# Patient Record
Sex: MALE | Race: WHITE | NOT HISPANIC OR LATINO | Employment: FULL TIME | URBAN - METROPOLITAN AREA
[De-identification: names, ages, dates, MRNs, and addresses within clinical notes are randomized per-mention and may not be internally consistent; named-entity substitution may affect disease eponyms.]

---

## 2022-01-27 ENCOUNTER — HOSPITAL ENCOUNTER (EMERGENCY)
Facility: CLINIC | Age: 32
Discharge: HOME | End: 2022-01-27
Attending: INTERNAL MEDICINE | Admitting: INTERNAL MEDICINE
Payer: COMMERCIAL

## 2022-01-27 ENCOUNTER — APPOINTMENT (OUTPATIENT)
Dept: GENERAL RADIOLOGY | Facility: CLINIC | Age: 32
End: 2022-01-27
Attending: INTERNAL MEDICINE
Payer: COMMERCIAL

## 2022-01-27 VITALS
OXYGEN SATURATION: 99 % | BODY MASS INDEX: 27.12 KG/M2 | HEART RATE: 70 BPM | TEMPERATURE: 98.6 F | SYSTOLIC BLOOD PRESSURE: 130 MMHG | RESPIRATION RATE: 16 BRPM | WEIGHT: 200 LBS | DIASTOLIC BLOOD PRESSURE: 94 MMHG

## 2022-01-27 DIAGNOSIS — S42.025A CLOSED NONDISPLACED FRACTURE OF SHAFT OF LEFT CLAVICLE, INITIAL ENCOUNTER: ICD-10-CM

## 2022-01-27 PROCEDURE — 73030 X-RAY EXAM OF SHOULDER: CPT | Mod: LT

## 2022-01-27 PROCEDURE — 73000 X-RAY EXAM OF COLLAR BONE: CPT | Mod: LT

## 2022-01-27 PROCEDURE — 99284 EMERGENCY DEPT VISIT MOD MDM: CPT | Mod: 25

## 2022-01-27 PROCEDURE — 99283 EMERGENCY DEPT VISIT LOW MDM: CPT | Mod: 25 | Performed by: INTERNAL MEDICINE

## 2022-01-27 PROCEDURE — 23500 CLTX CLAVICULAR FX W/O MNPJ: CPT | Mod: LT

## 2022-01-27 PROCEDURE — 23500 CLTX CLAVICULAR FX W/O MNPJ: CPT | Mod: 54 | Performed by: INTERNAL MEDICINE

## 2022-01-27 ASSESSMENT — ENCOUNTER SYMPTOMS
CONFUSION: 0
HEADACHES: 0
DIFFICULTY URINATING: 0
EYE REDNESS: 0
NECK STIFFNESS: 0
ARTHRALGIAS: 0
SHORTNESS OF BREATH: 0
FEVER: 0
COLOR CHANGE: 0
ABDOMINAL PAIN: 0
JOINT SWELLING: 1
NECK PAIN: 0

## 2022-01-28 ENCOUNTER — TELEPHONE (OUTPATIENT)
Dept: ORTHOPEDICS | Facility: CLINIC | Age: 32
End: 2022-01-28
Payer: COMMERCIAL

## 2022-01-28 NOTE — DISCHARGE INSTRUCTIONS
Please make an appointment to follow up with Orthopedics Clinic (phone: [phone]) or Sports Medicine (phone: [phone]) in 5-10 days even if entirely better.

## 2022-01-28 NOTE — ED PROVIDER NOTES
[facility] [place] ([facility])       1/27/22  History     Chief Complaint   Patient presents with     Shoulder Pain     playing hockey and collided with a goal post, felt collar bone pop twice, constant pain that worsens with movement     The history is provided by the patient and medical records.     Willem Coates is a 31 year old male who presents to the Emergency Department for evaluation of shoulder pain. Patient states he ran into a post while playing hockey tonight. Patient reports left anterior shoulder and clavicle pain, with some redness superior to the left clavicle. Patient states the pain is most intense with flexion of the left arm in front of the body. He denies arm pain or neck pain.     Past Medical History  History reviewed. No pertinent past medical history.  History reviewed. No pertinent surgical history.  ADDERALL 20 MG PO TABS  fexofenadine (ALLEGRA) 180 MG tablet  FLONASE 50 MCG/ACT NA SUSP      Allergies   Allergen Reactions     Dust Mites      Grass      Pollen Extract      Trees      Family History  History reviewed. No pertinent family history.  Social History   Social History     Tobacco Use     Smoking status: Current Every Day Smoker     Packs/day: 1.00     Smokeless tobacco: Never Used   Substance Use Topics     Alcohol use: Yes     Alcohol/week: 0.0 standard drinks     Drug use: No      Past medical history, past surgical history, medications, allergies, family history, and social history were reviewed with the patient. No additional pertinent items.     I have reviewed the Medications, Allergies, Past Medical and Surgical History, and Social History in the Epic system.    Review of Systems   Constitutional: Negative for fever.   HENT: Negative for congestion.    Eyes: Negative for redness.   Respiratory: Negative for shortness of breath.    Cardiovascular: Negative for chest pain.   Gastrointestinal: Negative for abdominal pain.   Genitourinary: Negative for  difficulty urinating.   Musculoskeletal: Positive for joint swelling. Negative for arthralgias, neck pain and neck stiffness.        Positive for left shoulder and clavicle pain   Skin: Negative for color change.   Neurological: Negative for headaches.   Psychiatric/Behavioral: Negative for confusion.     A complete review of systems was performed with pertinent positives and negatives noted in the HPI, and all other systems negative.    Physical Exam   BP: 134/79  Pulse: 70  Temp: 98.6  F (37  C)  Resp: 16  Weight: 90.7 kg (200 lb)  SpO2: 99 %      Physical Exam  Constitutional:       General: He is not in acute distress.     Appearance: He is not diaphoretic.   HENT:      Head: Atraumatic.   Eyes:      General: No scleral icterus.     Pupils: Pupils are equal, round, and reactive to light.   Cardiovascular:      Rate and Rhythm: Regular rhythm.      Heart sounds: Normal heart sounds.   Pulmonary:      Effort: No respiratory distress.      Breath sounds: Normal breath sounds.   Chest:      Chest wall: No tenderness.   Abdominal:      General: Bowel sounds are normal.      Palpations: Abdomen is soft.      Tenderness: There is no abdominal tenderness.   Musculoskeletal:      Right shoulder: Normal.      Left shoulder: Swelling, tenderness, bony tenderness and crepitus present. No deformity, effusion or laceration. Decreased range of motion. Normal strength. Normal pulse.        Arms:       Cervical back: Neck supple. No tenderness.      Thoracic back: No tenderness.      Lumbar back: No tenderness.   Skin:     General: Skin is warm.      Findings: No rash.   Neurological:      Mental Status: He is oriented to person, place, and time.         ED Course   At 9:46 PM the patient was seen and examined by Jolene Arias MD in Room ED03.        Procedures           Results for orders placed or performed during the hospital encounter of 01/27/22 (from the past 24 hour(s))   XR Shoulder Left G/E 3 Views    Narrative     EXAM: XR CLAVICLE LT 2 VIEWS, XR SHOULDER LEFT G/E 3 VIEWS  LOCATION: [facility]  DATE/TIME: 1/27/2022 10:31 PM    INDICATION: trauma pain  COMPARISON: None.      Impression    IMPRESSION: Undisplaced fracture of the shaft of the left clavicle at the junction of the proximal and middle thirds. The left glenohumeral joint is normal. No dislocation.    Clavicle XR, left    Narrative    EXAM: XR CLAVICLE LT 2 VIEWS, XR SHOULDER LEFT G/E 3 VIEWS  LOCATION: [facility]  DATE/TIME: 1/27/2022 10:31 PM    INDICATION: trauma pain  COMPARISON: None.      Impression    IMPRESSION: Undisplaced fracture of the shaft of the left clavicle at the junction of the proximal and middle thirds. The left glenohumeral joint is normal. No dislocation.      Medications - No data to display          Assessments & Plan (with Medical Decision Making)    Left clavicular closed nondisplaced fx as above after fall at the hockey, shoulder immobilizer in place, may use tylenol and or ibuprofen(pt had opioid use disorder in the past), follow up with Ortho or Sports Med in one week.         I have reviewed the nursing notes.    I have reviewed the findings, diagnosis, plan and need for follow up with the patient.    New Prescriptions    No medications on file       Final diagnoses:   Closed nondisplaced fracture of shaft of left clavicle, initial encounter       Delmi OCAMPO am serving as a trained medical scribe to document services personally performed by Jolene Arias MD, based on the provider's statements to me.      Jolene OCAMPO MD, was physically present and have reviewed and verified the accuracy of this note documented by Delmi Taylor.     Jolene Arias MD  1/27/2022   [facility] EMERGENCY DEPARTMENT     Jolene Arias MD  01/27/22 [id number]

## 2022-01-30 NOTE — TELEPHONE ENCOUNTER
DIAGNOSIS: closed nondisplaced fracture of shaft of left clavicle    APPOINTMENT DATE: 2.3.22   NOTES STATUS DETAILS   OFFICE NOTE from referring provider Internal 1.27.22 [facility] ED   OFFICE NOTE from other specialist N/A    DISCHARGE SUMMARY from hospital N/A    DISCHARGE REPORT from the ER Internal 1.27.22 [facility] ED   OPERATIVE REPORT N/A    EMG report N/A    MEDICATION LIST Internal    MRI N/A    DEXA (osteoporosis/bone health) N/A    CT SCAN N/A    XRAYS (IMAGES & REPORTS) Internal 1.27.22 L clavicle  1.27.22 L shoulder

## 2022-02-03 ENCOUNTER — PRE VISIT (OUTPATIENT)
Dept: ORTHOPEDICS | Facility: CLINIC | Age: 32
End: 2022-02-03

## 2022-02-03 ENCOUNTER — OFFICE VISIT (OUTPATIENT)
Dept: ORTHOPEDICS | Facility: CLINIC | Age: 32
End: 2022-02-03
Payer: COMMERCIAL

## 2022-02-03 VITALS — BODY MASS INDEX: 27.09 KG/M2 | HEIGHT: 72 IN | WEIGHT: 200 LBS

## 2022-02-03 DIAGNOSIS — S42.025A CLOSED NONDISPLACED FRACTURE OF SHAFT OF LEFT CLAVICLE, INITIAL ENCOUNTER: Primary | ICD-10-CM

## 2022-02-03 PROCEDURE — 99203 OFFICE O/P NEW LOW 30 MIN: CPT | Performed by: FAMILY MEDICINE

## 2022-02-03 ASSESSMENT — MIFFLIN-ST. JEOR: SCORE: 1895.19

## 2022-02-03 NOTE — PROGRESS NOTES
Sports Medicine Clinic Visit    PCP: Kiko Seymour Aminata is a 31 year old male who is seen  As an ED follow up presenting with a left clavicle fracture.  He feels that the pain is significantly improved in the last 2 days and he has been using Tylenol for discomfort.    Injury: Hockey injury, ran into support post    Location of Pain: left shoulder  Duration of Pain: 1/27/22  Rating of Pain: 5/10  Pain is better with: Sling/brace, advil  Pain is worse with: Movement  Additional Features: Occasional shooting pain  Treatment so far consists of: Aleve, sling  Prior History of related problems: None    Ht 1.829 m (6')   Wt 90.7 kg (200 lb)   BMI 27.12 kg/m         PMH:  Active problem list:  Patient Active Problem List   Diagnosis     Chronic rhinitis     Attention deficit disorder     Smoker     CARDIOVASCULAR SCREENING; LDL GOAL LESS THAN 160       FH:  No family history on file.    SH:  Social History     Socioeconomic History     Marital status: Single     Spouse name: Not on file     Number of children: Not on file     Years of education: Not on file     Highest education level: Not on file   Occupational History     Not on file   Tobacco Use     Smoking status: Current Every Day Smoker     Packs/day: 1.00     Smokeless tobacco: Never Used   Substance and Sexual Activity     Alcohol use: Yes     Alcohol/week: 0.0 standard drinks     Drug use: No     Sexual activity: Yes     Partners: Female     Birth control/protection: Pill   Other Topics Concern     Parent/sibling w/ CABG, MI or angioplasty before 65F 55M? Not Asked   Social History Narrative     Not on file     Social Determinants of Health     Financial Resource Strain: Not on file   Food Insecurity: Not on file   Transportation Needs: Not on file   Physical Activity: Not on file   Stress: Not on file   Social Connections: Not on file   Intimate Partner Violence: Not on file   Housing Stability: Not on file       MEDS:  See EMR,  reviewed  ALL:  See EMR, reviewed    REVIEW OF SYSTEMS:  CONSTITUTIONAL:NEGATIVE for fever, chills, change in weight  INTEGUMENTARY/SKIN: NEGATIVE for worrisome rashes, moles or lesions  EYES: NEGATIVE for vision changes or irritation  ENT/MOUTH: NEGATIVE for ear, mouth and throat problems  RESP:NEGATIVE for significant cough or SOB  BREAST: NEGATIVE for masses, tenderness or discharge  CV: NEGATIVE for chest pain, palpitations or peripheral edema  GI: NEGATIVE for nausea, abdominal pain, heartburn, or change in bowel habits  :NEGATIVE for frequency, dysuria, or hematuria  :NEGATIVE for frequency, dysuria, or hematuria  NEURO: NEGATIVE for weakness, dizziness or paresthesias  ENDOCRINE: NEGATIVE for temperature intolerance, skin/hair changes  HEME/ALLERGY/IMMUNE: NEGATIVE for bleeding problems  PSYCHIATRIC: NEGATIVE for changes in mood or affect        Objective: There is some superficial swelling over the site of the fracture at the mid clavicle but no displacement is noted.  Nontender at the SCJ joint or AC joint.  Axillary nerve function is intact.  Supraspinatus, infraspinatus and subscapularis strength is 5 out of 5.  Sensation is intact in the left upper extremity.  Distal pulses intact.  Overlying skin is intact.  There are some bruising at the site of the distal clavicle.  No signs of cellulitis.  Appropriate in conversation and affect.      We went over previous x-rays that show a nondisplaced fracture of the mid clavicle    Assessment left-sided clavicle fracture    Plan: He will transfer to a sling.  He remove the sling each day for range of motion exercises for the elbow.  It safe to do activities below the level of his shoulders.  He works as a  but he indicates that he have other people that can help with lifting and reaching.  He will avoid overhead activities for the next 4 weeks.  Follow-up in 3 weeks for repeat x-ray.  He does not plan on returning to hockey for the next 2 to 3  months.

## 2022-02-03 NOTE — LETTER
2/3/2022      RE: Willem Coates  [address] [id number]       Sports Medicine Clinic Visit    PCP: Kiko Seymour    Willem Coates is a 31 year old male who is seen  As an ED follow up presenting with a left clavicle fracture.  He feels that the pain is significantly improved in the last 2 days and he has been using Tylenol for discomfort.    Injury: Hockey injury, ran into support post    Location of Pain: left shoulder  Duration of Pain: 1/27/22  Rating of Pain: 5/10  Pain is better with: Sling/brace, advil  Pain is worse with: Movement  Additional Features: Occasional shooting pain  Treatment so far consists of: Aleve, sling  Prior History of related problems: None    Ht 1.829 m (6')   Wt 90.7 kg (200 lb)   BMI 27.12 kg/m         PMH:  Active problem list:  Patient Active Problem List   Diagnosis     Chronic rhinitis     Attention deficit disorder     Smoker     CARDIOVASCULAR SCREENING; LDL GOAL LESS THAN 160       FH:  No family history on file.    SH:  Social History     Socioeconomic History     Marital status: Single     Spouse name: Not on file     Number of children: Not on file     Years of education: Not on file     Highest education level: Not on file   Occupational History     Not on file   Tobacco Use     Smoking status: Current Every Day Smoker     Packs/day: 1.00     Smokeless tobacco: Never Used   Substance and Sexual Activity     Alcohol use: Yes     Alcohol/week: 0.0 standard drinks     Drug use: No     Sexual activity: Yes     Partners: Female     Birth control/protection: Pill   Other Topics Concern     Parent/sibling w/ CABG, MI or angioplasty before 65F 55M? Not Asked   Social History Narrative     Not on file     Social Determinants of Health     Financial Resource Strain: Not on file   Food Insecurity: Not on file   Transportation Needs: Not on file   Physical Activity: Not on file   Stress: Not on file   Social Connections: Not on file   Intimate Partner  Violence: Not on file   Housing Stability: Not on file       MEDS:  See EMR, reviewed  ALL:  See EMR, reviewed    REVIEW OF SYSTEMS:  CONSTITUTIONAL:NEGATIVE for fever, chills, change in weight  INTEGUMENTARY/SKIN: NEGATIVE for worrisome rashes, moles or lesions  EYES: NEGATIVE for vision changes or irritation  ENT/MOUTH: NEGATIVE for ear, mouth and throat problems  RESP:NEGATIVE for significant cough or SOB  BREAST: NEGATIVE for masses, tenderness or discharge  CV: NEGATIVE for chest pain, palpitations or peripheral edema  GI: NEGATIVE for nausea, abdominal pain, heartburn, or change in bowel habits  :NEGATIVE for frequency, dysuria, or hematuria  :NEGATIVE for frequency, dysuria, or hematuria  NEURO: NEGATIVE for weakness, dizziness or paresthesias  ENDOCRINE: NEGATIVE for temperature intolerance, skin/hair changes  HEME/ALLERGY/IMMUNE: NEGATIVE for bleeding problems  PSYCHIATRIC: NEGATIVE for changes in mood or affect        Objective: There is some superficial swelling over the site of the fracture at the mid clavicle but no displacement is noted.  Nontender at the SCJ joint or AC joint.  Axillary nerve function is intact.  Supraspinatus, infraspinatus and subscapularis strength is 5 out of 5.  Sensation is intact in the left upper extremity.  Distal pulses intact.  Overlying skin is intact.  There are some bruising at the site of the distal clavicle.  No signs of cellulitis.  Appropriate in conversation and affect.      We went over previous x-rays that show a nondisplaced fracture of the mid clavicle    Assessment left-sided clavicle fracture    Plan: He will transfer to a sling.  He remove the sling each day for range of motion exercises for the elbow.  It safe to do activities below the level of his shoulders.  He works as a  but he indicates that he have other people that can help with lifting and reaching.  He will avoid overhead activities for the next 4 weeks.  Follow-up in 3 weeks for repeat  x-ray.  He does not plan on returning to hockey for the next 2 to 3 months.      Yoel Pichardo MD

## 2022-02-21 DIAGNOSIS — S42.025A CLOSED NONDISPLACED FRACTURE OF SHAFT OF LEFT CLAVICLE, INITIAL ENCOUNTER: Primary | ICD-10-CM

## 2022-02-24 ENCOUNTER — OFFICE VISIT (OUTPATIENT)
Dept: ORTHOPEDICS | Facility: CLINIC | Age: 32
End: 2022-02-24
Payer: COMMERCIAL

## 2022-02-24 ENCOUNTER — ANCILLARY PROCEDURE (OUTPATIENT)
Dept: GENERAL RADIOLOGY | Facility: CLINIC | Age: 32
End: 2022-02-24
Attending: FAMILY MEDICINE
Payer: COMMERCIAL

## 2022-02-24 VITALS — HEIGHT: 72 IN | WEIGHT: 200 LBS | BODY MASS INDEX: 27.09 KG/M2

## 2022-02-24 DIAGNOSIS — S42.022D DISPLACED FRACTURE OF SHAFT OF LEFT CLAVICLE, SUBSEQUENT ENCOUNTER FOR FRACTURE WITH ROUTINE HEALING: Primary | ICD-10-CM

## 2022-02-24 DIAGNOSIS — S42.025A CLOSED NONDISPLACED FRACTURE OF SHAFT OF LEFT CLAVICLE, INITIAL ENCOUNTER: ICD-10-CM

## 2022-02-24 PROCEDURE — 99213 OFFICE O/P EST LOW 20 MIN: CPT | Performed by: FAMILY MEDICINE

## 2022-02-24 PROCEDURE — 73000 X-RAY EXAM OF COLLAR BONE: CPT | Mod: LT | Performed by: RADIOLOGY

## 2022-02-24 NOTE — LETTER
2/24/2022      RE: Willem Coates  [address] [address]       February 24, 2022: Willem Coates is a 31 year old male who is seen in f/u up for a left clavicle fx. he indicates that his shoulder feels much improved compared to 4 weeks ago.  He has only minimal discomfort and does not feel limited in his shoulder motion.    He denies a new injury between our previous visit and today's visit.    S:  fup clavicle fracture x 4 wks    Injury: Hockey injury, ran into support post  Duration of Pain: 1/27/22    Pt is a ,     PMH:    Active problem list:  Patient Active Problem List   Diagnosis     Chronic rhinitis     Attention deficit disorder     Smoker     CARDIOVASCULAR SCREENING; LDL GOAL LESS THAN 160       FH:  No family history on file.    SH:  Social History     Socioeconomic History     Marital status: Single     Spouse name: Not on file     Number of children: Not on file     Years of education: Not on file     Highest education level: Not on file   Occupational History     Not on file   Tobacco Use     Smoking status: Current Every Day Smoker     Packs/day: 1.00     Smokeless tobacco: Never Used   Substance and Sexual Activity     Alcohol use: Yes     Alcohol/week: 0.0 standard drinks     Drug use: No     Sexual activity: Yes     Partners: Female     Birth control/protection: Pill   Other Topics Concern     Parent/sibling w/ CABG, MI or angioplasty before 65F 55M? Not Asked   Social History Narrative     Not on file     Social Determinants of Health     Financial Resource Strain: Not on file   Food Insecurity: Not on file   Transportation Needs: Not on file   Physical Activity: Not on file   Stress: Not on file   Social Connections: Not on file   Intimate Partner Violence: Not on file   Housing Stability: Not on file       MEDS:  See EMR, reviewed  ALL:  See EMR, reviewed    REVIEW OF SYSTEMS:  CONSTITUTIONAL:NEGATIVE for fever, chills, change in  weight  INTEGUMENTARY/SKIN: NEGATIVE for worrisome rashes, moles or lesions  EYES: NEGATIVE for vision changes or irritation  ENT/MOUTH: NEGATIVE for ear, mouth and throat problems  RESP:NEGATIVE for significant cough or SOB  BREAST: NEGATIVE for masses, tenderness or discharge  CV: NEGATIVE for chest pain, palpitations or peripheral edema  GI: NEGATIVE for nausea, abdominal pain, heartburn, or change in bowel habits  :NEGATIVE for frequency, dysuria, or hematuria  :NEGATIVE for frequency, dysuria, or hematuria  NEURO: NEGATIVE for weakness, dizziness or paresthesias  ENDOCRINE: NEGATIVE for temperature intolerance, skin/hair changes  HEME/ALLERGY/IMMUNE: NEGATIVE for bleeding problems  PSYCHIATRIC: NEGATIVE for changes in mood or affect        Objective: He has palpable deformity in the mid clavicle that is nontender.  No crepitance noted.  Overlying skin is normal.  He is nontender at the distal AC joint.  He can forward flex 120 degrees and abduct 90 degrees.  He has 5 out of 5 strength bilaterally at deltoid, supraspinatus, infraspinatus and subscapularis.  Overlying skin is intact without tenting or disruption.    We went over updated x-rays that show that he had displacement of the clavicle fracture between his initial x-rays and today.  There is a slight foreshortening and slight offset.  There is some evidence of new bone formation.    Assessment: Left-sided mid clavicle fracture, healing    Plan: The fracture is still in a good position to heal without shoulder dysfunction.  He knows it will heal with a cosmetic deformity and palpable callus in the area of the clavicle.  We discussed the option of clavicular insert stabilization surgery with Dr. Barakat, although I think at this date in his healing process surgery is unlikely to be a realistic option.  Patient feels that his shoulder is functional at this time and is not wanting to proceed with the surgery unless it is necessary.  We will have a  follow-up visit in a month for repeat x-ray.        Yoel Pichardo MD

## 2022-02-24 NOTE — PROGRESS NOTES
February 24, 2022: Willem Coates is a 31 year old male who is seen in f/u up for a left clavicle fx. he indicates that his shoulder feels much improved compared to 4 weeks ago.  He has only minimal discomfort and does not feel limited in his shoulder motion.    He denies a new injury between our previous visit and today's visit.    S:  fup clavicle fracture x 4 wks    Injury: Hockey injury, ran into support post  Duration of Pain: 1/27/22    Pt is a ,     PMH:    Active problem list:  Patient Active Problem List   Diagnosis     Chronic rhinitis     Attention deficit disorder     Smoker     CARDIOVASCULAR SCREENING; LDL GOAL LESS THAN 160       FH:  No family history on file.    SH:  Social History     Socioeconomic History     Marital status: Single     Spouse name: Not on file     Number of children: Not on file     Years of education: Not on file     Highest education level: Not on file   Occupational History     Not on file   Tobacco Use     Smoking status: Current Every Day Smoker     Packs/day: 1.00     Smokeless tobacco: Never Used   Substance and Sexual Activity     Alcohol use: Yes     Alcohol/week: 0.0 standard drinks     Drug use: No     Sexual activity: Yes     Partners: Female     Birth control/protection: Pill   Other Topics Concern     Parent/sibling w/ CABG, MI or angioplasty before 65F 55M? Not Asked   Social History Narrative     Not on file     Social Determinants of Health     Financial Resource Strain: Not on file   Food Insecurity: Not on file   Transportation Needs: Not on file   Physical Activity: Not on file   Stress: Not on file   Social Connections: Not on file   Intimate Partner Violence: Not on file   Housing Stability: Not on file       MEDS:  See EMR, reviewed  ALL:  See EMR, reviewed    REVIEW OF SYSTEMS:  CONSTITUTIONAL:NEGATIVE for fever, chills, change in weight  INTEGUMENTARY/SKIN: NEGATIVE for worrisome rashes, moles or lesions  EYES: NEGATIVE for vision changes  or irritation  ENT/MOUTH: NEGATIVE for ear, mouth and throat problems  RESP:NEGATIVE for significant cough or SOB  BREAST: NEGATIVE for masses, tenderness or discharge  CV: NEGATIVE for chest pain, palpitations or peripheral edema  GI: NEGATIVE for nausea, abdominal pain, heartburn, or change in bowel habits  :NEGATIVE for frequency, dysuria, or hematuria  :NEGATIVE for frequency, dysuria, or hematuria  NEURO: NEGATIVE for weakness, dizziness or paresthesias  ENDOCRINE: NEGATIVE for temperature intolerance, skin/hair changes  HEME/ALLERGY/IMMUNE: NEGATIVE for bleeding problems  PSYCHIATRIC: NEGATIVE for changes in mood or affect        Objective: He has palpable deformity in the mid clavicle that is nontender.  No crepitance noted.  Overlying skin is normal.  He is nontender at the distal AC joint.  He can forward flex 120 degrees and abduct 90 degrees.  He has 5 out of 5 strength bilaterally at deltoid, supraspinatus, infraspinatus and subscapularis.  Overlying skin is intact without tenting or disruption.    We went over updated x-rays that show that he had displacement of the clavicle fracture between his initial x-rays and today.  There is a slight foreshortening and slight offset.  There is some evidence of new bone formation.    Assessment: Left-sided mid clavicle fracture, healing    Plan: The fracture is still in a good position to heal without shoulder dysfunction.  He knows it will heal with a cosmetic deformity and palpable callus in the area of the clavicle.  We discussed the option of clavicular insert stabilization surgery with Dr. Barakat, although I think at this date in his healing process surgery is unlikely to be a realistic option.  Patient feels that his shoulder is functional at this time and is not wanting to proceed with the surgery unless it is necessary.  We will have a follow-up visit in a month for repeat x-ray.

## 2022-03-22 DIAGNOSIS — S42.022D DISPLACED FRACTURE OF SHAFT OF LEFT CLAVICLE, SUBSEQUENT ENCOUNTER FOR FRACTURE WITH ROUTINE HEALING: Primary | ICD-10-CM

## 2022-08-03 ENCOUNTER — OFFICE VISIT (OUTPATIENT)
Dept: URGENT CARE | Facility: URGENT CARE | Age: 32
End: 2022-08-03
Payer: COMMERCIAL

## 2022-08-03 VITALS
HEART RATE: 85 BPM | SYSTOLIC BLOOD PRESSURE: 122 MMHG | HEIGHT: 72 IN | RESPIRATION RATE: 15 BRPM | TEMPERATURE: 97.8 F | OXYGEN SATURATION: 99 % | BODY MASS INDEX: 26.41 KG/M2 | WEIGHT: 195 LBS | DIASTOLIC BLOOD PRESSURE: 70 MMHG

## 2022-08-03 DIAGNOSIS — J02.0 ACUTE STREPTOCOCCAL PHARYNGITIS: Primary | ICD-10-CM

## 2022-08-03 DIAGNOSIS — R07.0 THROAT PAIN: ICD-10-CM

## 2022-08-03 LAB — DEPRECATED S PYO AG THROAT QL EIA: POSITIVE

## 2022-08-03 PROCEDURE — 87880 STREP A ASSAY W/OPTIC: CPT | Performed by: FAMILY MEDICINE

## 2022-08-03 PROCEDURE — 99203 OFFICE O/P NEW LOW 30 MIN: CPT | Performed by: FAMILY MEDICINE

## 2022-08-03 RX ORDER — AMOXICILLIN 500 MG/1
500 CAPSULE ORAL 2 TIMES DAILY
Qty: 20 CAPSULE | Refills: 0 | Status: SHIPPED | OUTPATIENT
Start: 2022-08-03 | End: 2022-08-13

## 2022-08-03 NOTE — PATIENT INSTRUCTIONS
# Acute Strep Pharyngitis: symptoms noted over the past several days with progressive worsening pain in the back of his throat with associated worsening with swallowing. There is erythema in the posterior pharynx, with associated painful anterior cervical lymphadenopathy. Strep today was positive. Given this plan to treat is below and follow-up if not improving.  Testing Findings: positive strep test  Treatment: antibiotics as below  JobSchool: as tolerated  Medications: amoxicillin ordered to the pharmacy, Limited tylenol/ibuprofen for pain for 1-2 weeks   Follow-up: In 1-2 weeks if symptoms do not improve with primary care provider, sooner if worsening  Can consider further evaluation if symptoms persist past finishing antibiotics      It was great seeing you today!    Wojciech Spivey MD, CAQSM

## 2022-08-03 NOTE — PROGRESS NOTES
Assessment & Plan     Throat pain     - Streptococcus A Rapid Screen w/Reflex to PCR - Clinic Collect    Acute streptococcal pharyngitis     - amoxicillin (AMOXIL) 500 MG capsule  Dispense: 20 capsule; Refill: 0    # Acute Strep Pharyngitis: symptoms noted over the past several days with progressive worsening pain in the back of his throat with associated worsening with swallowing. There is erythema in the posterior pharynx, with associated painful anterior cervical lymphadenopathy. Strep today was positive. Given this plan to treat is below and follow-up if not improving.  Testing Findings: positive strep test  Treatment: antibiotics as below  JobSchool: as tolerated  Medications: amoxicillin ordered to the pharmacy, Limited tylenol/ibuprofen for pain for 1-2 weeks   Follow-up: In 1-2 weeks if symptoms do not improve with primary care provider, sooner if worsening  Can consider further evaluation if symptoms persist past finishing antibiotics        Return if symptoms worsen or fail to improve.    Wojciech Spivey MD  [facility] is a 32 year old male who presents to clinic today for the following health issues:  Chief Complaint   Patient presents with     Urgent Care     URI     Sore throat and some shortness of breath, neg home tests.      HPI    URI Adult    Onset of symptoms was 2 day(s) ago.  Course of illness is worsening.    Severity moderately severe  Current and Associated symptoms: sore throat, hoarse voice and pain with swallowing  Treatment measures tried include /Ibuprofen.  Predisposing factors include None.  No f/chills  Pt is a smoker 1/2 ppd       Review of Systems  Constitutional, HEENT, cardiovascular, pulmonary, GI, , musculoskeletal, neuro, skin, endocrine and psych systems are negative, except as otherwise noted.      Objective    /70   Pulse 85   Temp 97.8  F (36.6  C) (Temporal)   Resp 15   Ht 1.829 m (6')   Wt 88.5 kg  (195 lb)   SpO2 99%   BMI 26.45 kg/m    Physical Exam   GENERAL: healthy, alert and no distress  HEENT: erythema the posterior pharynx  NECK: painful anterior cervical lymphadenopathy  RESP: lungs clear to auscultation - no rales, rhonchi or wheezes  CV: regular rate and rhythm, normal S1 S2, no S3 or S4, no murmur, click or rub, no peripheral edema and peripheral pulses strong  ABDOMEN: soft, nontender, no hepatosplenomegaly, no masses and bowel sounds normal  MS: no gross musculoskeletal defects noted, no edema    Strep positive

## 2023-03-07 ENCOUNTER — OFFICE VISIT (OUTPATIENT)
Dept: URGENT CARE | Facility: URGENT CARE | Age: 33
End: 2023-03-07
Payer: COMMERCIAL

## 2023-03-07 VITALS
DIASTOLIC BLOOD PRESSURE: 70 MMHG | HEART RATE: 74 BPM | OXYGEN SATURATION: 100 % | TEMPERATURE: 98.1 F | SYSTOLIC BLOOD PRESSURE: 120 MMHG

## 2023-03-07 DIAGNOSIS — J02.0 STREP PHARYNGITIS: Primary | ICD-10-CM

## 2023-03-07 LAB — DEPRECATED S PYO AG THROAT QL EIA: POSITIVE

## 2023-03-07 PROCEDURE — 87880 STREP A ASSAY W/OPTIC: CPT

## 2023-03-07 PROCEDURE — 99213 OFFICE O/P EST LOW 20 MIN: CPT | Performed by: EMERGENCY MEDICINE

## 2023-03-07 RX ORDER — PENICILLIN V POTASSIUM 500 MG/1
500 TABLET, FILM COATED ORAL 2 TIMES DAILY
Qty: 20 TABLET | Refills: 0 | Status: SHIPPED | OUTPATIENT
Start: 2023-03-07 | End: 2023-03-17

## 2023-03-08 NOTE — PROGRESS NOTES
Assessment & Plan     Diagnosis:  (J02.0) Strep pharyngitis  (primary encounter diagnosis)  Plan: Streptococcus A Rapid Screen w/Reflex to PCR -         Clinic Collect, penicillin V (VEETID) 500 MG         tablet      Medical Decision Making:  Willem Coates is a 32 year old male who presents with sore throat and clinical evidence of pharyngitis.  The rapid strep test is positive. I see no clinical evidence of  peritonsillar abscess, retropharyngeal abscess, Lemierre's Syndrome, epiglottis, or Milind's angina. The patient's symptoms are consistent with streptococcal pharyngitis.  Uvula midline.  Non-toxic appearing.  No drooling.  No stridor. I have recommended treatment with antibiotics and analgesics.  Go to the ER if increasing pain, change in voice, neck pain, vomiting, fever, or shortness of breath. Follow-up with primary physician if not improving in 3-5 days. Patient verbalized understanding and agreement with the plan.    Grupo Luciaon PA-C  [facility]    Subjective     Willem Coates is a 32 year old male who presents to clinic today for the following health issues:  Chief Complaint   Patient presents with     Pharyngitis     X 4-5 days, yesterday hard to swallow        HPI    Onset of symptoms was 4-5 day(s) ago.  Course of illness is worsening.    Severity moderate  Current and Associated symptoms: fever, chills and sore throat and swollen tonsils. Pain swallowing.  Denies cough - non-productive, wheezing, shortness of breath, nausea, vomiting, diarrhea, not eating, not sleeping well and taking in fluids?  Yes. able to eat/swallow solids but it hurts.   Treatment measures tried include Tylenol/Ibuprofen  Predisposing factors include hx of recurrent strep infections over the past year -- has now had it 3-4 times.       Review of Systems    See HPI    Objective      Vitals: /70 (BP Location: Left arm, Patient Position: Sitting, Cuff Size: Adult Large)   Pulse 74   Temp 98.1  F  (36.7  C) (Tympanic)   SpO2 100%   Resp: 16    Patient Vitals for the past 24 hrs:   BP Temp Temp src Pulse SpO2   03/07/23 1820 120/70 98.1  F (36.7  C) Tympanic 74 100 %       Vital signs reviewed by: Grupo Luciano PA-C    Physical Exam   Constitutional: Alert and active.  Non-toxic appearing.  No acute distress.  HENT:   Right Ear: External ear normal. TM: gray/pale appearing  Left Ear: External ear normal. TM: gray/pale appearing.  Nose: Nose normal.    Eyes: Conjunctivae, EOM and lids are normal.   Mouth: Mucous membranes are moist. Posterior oropharynx is erythematous. Exudates present. Tonsils are symmetrically enlarged. Uvula is midline. No submandibular swelling or erythema.  Neck: Normal ROM. No meningismus. Mild anterior cervical lymphadenopathy. No posterior chain cervical lymphadenopathy noted.  Cardiovascular: Regular rate and rhythm  Pulmonary/Chest: Effort normal. No respiratory distress. Lungs are clear to auscultation throughout.  GI: Abdomen is soft and non-tender throughout. No hepatosplenomegaly.  Musculoskeletal: Normal range of motion.   Neurological: Alert and appropriately oriented for age.   Skin: No rash noted on visualized skin.     Labs/Imaging:  Results for orders placed or performed in visit on 03/07/23   Streptococcus A Rapid Screen w/Reflex to PCR - Clinic Collect     Status: Abnormal    Specimen: Throat; Swab   Result Value Ref Range    Group A Strep antigen Positive (A) Negative         Grupo Luciano PA-C, March 7, 2023

## 2023-04-03 NOTE — PROGRESS NOTES
SUBJECTIVE:   CC: Jarred is an 33 year old who presents for preventative health visit.        View : No data to display.            Pt is new to me as well as the clinic    Here for preventive care     He also c/o recurrent strep throat.  He counts 4 episodes strep throat in the last year.      He did have adenoids removed around age 9.      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      Smokes 1/2 ppd.      Patient has been advised of split billing requirements and indicates understanding: Yes  Healthy Habits:     Getting at least 3 servings of Calcium per day:  Yes    Bi-annual eye exam:  NO    Dental care twice a year:  NO    Sleep apnea or symptoms of sleep apnea:  None    Diet:  Regular (no restrictions)    Frequency of exercise:  4-5 days/week    Duration of exercise:  30-45 minutes    Taking medications regularly:  Yes    Barriers to taking medications:  None    Medication side effects:  None    PHQ-2 Total Score: 2    Additional concerns today:  Yes        Today's PHQ-2 Score:       4/4/2023    10:59 AM   PHQ-2 ( 1999 [employer])   Q1: Little interest or pleasure in doing things 1   Q2: Feeling down, depressed or hopeless 1   PHQ-2 Score 2   Q1: Little interest or pleasure in doing things Several days    Several days   Q2: Feeling down, depressed or hopeless Several days    Several days   PHQ-2 Score 2    2       Have you ever done Advance Care Planning? (For example, a Health Directive, POLST, or a discussion with a medical provider or your loved ones about your wishes): No, advance care planning information given to patient to review.  Patient plans to discuss their wishes with loved ones or provider.      Social History     Tobacco Use     Smoking status: Every Day     Packs/day: 1.00     Types: Cigarettes     Smokeless tobacco: Never   Vaping Use     Vaping status: Not on file   Substance Use Topics     Alcohol use: Yes     Comment: 20 per month             4/4/2023    10:59 AM   Alcohol Use    Prescreen: >3 drinks/day or >7 drinks/week? No          View : No data to display.                Last PSA: No results found for: PSA    Reviewed orders with patient. Reviewed health maintenance and updated orders accordingly - Yes  Lab work is in process    Reviewed and updated as needed this visit by clinical staff   Tobacco  Allergies       Soc Hx        Reviewed and updated as needed this visit by Provider                     Review of Systems   Constitutional: Negative for chills and fever.   HENT: Negative for congestion, ear pain, hearing loss and sore throat.    Eyes: Negative for pain and visual disturbance.   Respiratory: Positive for cough and shortness of breath.    Cardiovascular: Negative for chest pain, palpitations and peripheral edema.   Gastrointestinal: Negative for abdominal pain, constipation, diarrhea, heartburn, hematochezia and nausea.   Genitourinary: Negative for dysuria, frequency, genital sores, hematuria, impotence, penile discharge and urgency.   Musculoskeletal: Negative for arthralgias, joint swelling and myalgias.   Skin: Negative for rash.   Neurological: Negative for dizziness, weakness, headaches and paresthesias.   Psychiatric/Behavioral: Negative for mood changes. The patient is nervous/anxious.          OBJECTIVE:   /74 (BP Location: Right arm, Patient Position: Chair, Cuff Size: Adult Large)   Pulse 83   Temp 98  F (36.7  C) (Temporal)   Resp 18   Ht 1.829 m (6')   Wt 101.2 kg (223 lb)   SpO2 100%   BMI 30.24 kg/m      Physical Exam  GENERAL: healthy, alert and no distress  EYES: Eyes grossly normal to inspection, PERRL and conjunctivae and sclerae normal  HENT: ear canals and TM's normal, nose and mouth without ulcers or lesions. PROMINENT TONSILS  NECK: no adenopathy, no asymmetry, masses, or scars and thyroid normal to palpation  RESP: lungs clear to auscultation - no rales, rhonchi or wheezes  CV: regular rate and rhythm, normal S1 S2, no S3 or S4, no  murmur, click or rub, no peripheral edema and peripheral pulses strong  ABDOMEN: soft, nontender, no hepatosplenomegaly, no masses and bowel sounds normal  MS: no gross musculoskeletal defects noted, no edema  SKIN: no suspicious lesions or rashes  NEURO: Normal strength and tone, mentation intact and speech normal  PSYCH: mentation appears normal, affect normal/bright        ASSESSMENT/PLAN:       ICD-10-CM    1. Encounter for preventive care   Age and gender appropriate preventive care and screenings are discussed.  Particular attention to personal preventive care and age appropriate lifestyle including the incorporation of healthy diet and physical activity is made.     Z00.00 CBC with Platelets & Differential     Comprehensive metabolic panel     TSH with free T4 reflex     Lipid panel reflex to direct LDL Fasting      2. Need for hepatitis C screening test  Z11.59 Hepatitis C Screen Reflex to HCV RNA Quant and Genotype      3. Routine screening for STI (sexually transmitted infection)   Asymptomatic.   Z11.3 Treponema Abs w Reflex to RPR and Titer     Hepatitis C Screen Reflex to HCV RNA Quant and Genotype     HIV Antigen Antibody Combo     NEISSERIA GONORRHOEA PCR     CHLAMYDIA TRACHOMATIS PCR                COUNSELING:   Reviewed preventive health counseling, as reflected in patient instructions      BMI:   Estimated body mass index is 30.24 kg/m  as calculated from the following:    Height as of this encounter: 1.829 m (6').    Weight as of this encounter: 101.2 kg (223 lb).         He reports that he has been smoking cigarettes. He has been smoking an average of 1 pack per day. He has never used smokeless tobacco.  Nicotine/Tobacco Cessation Plan:   cessation recommended. no interventions offered today        Russell Meier MD  [facility]

## 2023-04-04 ENCOUNTER — OFFICE VISIT (OUTPATIENT)
Dept: FAMILY MEDICINE | Facility: CLINIC | Age: 33
End: 2023-04-04
Payer: COMMERCIAL

## 2023-04-04 VITALS
HEIGHT: 72 IN | OXYGEN SATURATION: 100 % | BODY MASS INDEX: 30.2 KG/M2 | HEART RATE: 83 BPM | SYSTOLIC BLOOD PRESSURE: 133 MMHG | RESPIRATION RATE: 18 BRPM | DIASTOLIC BLOOD PRESSURE: 74 MMHG | WEIGHT: 223 LBS | TEMPERATURE: 98 F

## 2023-04-04 DIAGNOSIS — Z11.59 NEED FOR HEPATITIS C SCREENING TEST: ICD-10-CM

## 2023-04-04 DIAGNOSIS — Z11.3 ROUTINE SCREENING FOR STI (SEXUALLY TRANSMITTED INFECTION): ICD-10-CM

## 2023-04-04 DIAGNOSIS — Z00.00 ENCOUNTER FOR PREVENTIVE CARE: Primary | ICD-10-CM

## 2023-04-04 LAB
ALBUMIN SERPL BCG-MCNC: 4.3 G/DL (ref 3.5–5.2)
ALP SERPL-CCNC: 66 U/L (ref 40–129)
ALT SERPL W P-5'-P-CCNC: 14 U/L (ref 10–50)
ANION GAP SERPL CALCULATED.3IONS-SCNC: 11 MMOL/L (ref 7–15)
AST SERPL W P-5'-P-CCNC: 19 U/L (ref 10–50)
BASOPHILS # BLD AUTO: 0 10E3/UL (ref 0–0.2)
BASOPHILS NFR BLD AUTO: 0 %
BILIRUB SERPL-MCNC: 0.2 MG/DL
BUN SERPL-MCNC: 11.7 MG/DL (ref 6–20)
CALCIUM SERPL-MCNC: 9.3 MG/DL (ref 8.6–10)
CHLORIDE SERPL-SCNC: 103 MMOL/L (ref 98–107)
CHOLEST SERPL-MCNC: 242 MG/DL
CREAT SERPL-MCNC: 0.83 MG/DL (ref 0.67–1.17)
DEPRECATED HCO3 PLAS-SCNC: 25 MMOL/L (ref 22–29)
EOSINOPHIL # BLD AUTO: 0.3 10E3/UL (ref 0–0.7)
EOSINOPHIL NFR BLD AUTO: 3 %
ERYTHROCYTE [DISTWIDTH] IN BLOOD BY AUTOMATED COUNT: 12.9 % (ref 10–15)
GFR SERPL CREATININE-BSD FRML MDRD: >90 ML/MIN/1.73M2
GLUCOSE SERPL-MCNC: 105 MG/DL (ref 70–99)
HCT VFR BLD AUTO: 42.3 % (ref 40–53)
HDLC SERPL-MCNC: 78 MG/DL
HGB BLD-MCNC: 13.5 G/DL (ref 13.3–17.7)
IMM GRANULOCYTES # BLD: 0 10E3/UL
IMM GRANULOCYTES NFR BLD: 0 %
LDLC SERPL CALC-MCNC: 148 MG/DL
LYMPHOCYTES # BLD AUTO: 2.3 10E3/UL (ref 0.8–5.3)
LYMPHOCYTES NFR BLD AUTO: 27 %
MCH RBC QN AUTO: 27.8 PG (ref 26.5–33)
MCHC RBC AUTO-ENTMCNC: 31.9 G/DL (ref 31.5–36.5)
MCV RBC AUTO: 87 FL (ref 78–100)
MONOCYTES # BLD AUTO: 0.7 10E3/UL (ref 0–1.3)
MONOCYTES NFR BLD AUTO: 8 %
NEUTROPHILS # BLD AUTO: 5.1 10E3/UL (ref 1.6–8.3)
NEUTROPHILS NFR BLD AUTO: 61 %
NONHDLC SERPL-MCNC: 164 MG/DL
PLATELET # BLD AUTO: 326 10E3/UL (ref 150–450)
POTASSIUM SERPL-SCNC: 4.2 MMOL/L (ref 3.4–5.3)
PROT SERPL-MCNC: 7.2 G/DL (ref 6.4–8.3)
RBC # BLD AUTO: 4.86 10E6/UL (ref 4.4–5.9)
SODIUM SERPL-SCNC: 139 MMOL/L (ref 136–145)
TRIGL SERPL-MCNC: 82 MG/DL
TSH SERPL DL<=0.005 MIU/L-ACNC: 1.39 UIU/ML (ref 0.3–4.2)
WBC # BLD AUTO: 8.3 10E3/UL (ref 4–11)

## 2023-04-04 PROCEDURE — 80050 GENERAL HEALTH PANEL: CPT | Performed by: INTERNAL MEDICINE

## 2023-04-04 PROCEDURE — 99395 PREV VISIT EST AGE 18-39: CPT | Mod: 25 | Performed by: INTERNAL MEDICINE

## 2023-04-04 PROCEDURE — 80061 LIPID PANEL: CPT | Performed by: INTERNAL MEDICINE

## 2023-04-04 PROCEDURE — 86780 TREPONEMA PALLIDUM: CPT | Performed by: INTERNAL MEDICINE

## 2023-04-04 PROCEDURE — 87491 CHLMYD TRACH DNA AMP PROBE: CPT | Performed by: INTERNAL MEDICINE

## 2023-04-04 PROCEDURE — 36415 COLL VENOUS BLD VENIPUNCTURE: CPT | Performed by: INTERNAL MEDICINE

## 2023-04-04 PROCEDURE — 86803 HEPATITIS C AB TEST: CPT | Performed by: INTERNAL MEDICINE

## 2023-04-04 PROCEDURE — 87389 HIV-1 AG W/HIV-1&-2 AB AG IA: CPT | Performed by: INTERNAL MEDICINE

## 2023-04-04 PROCEDURE — 87591 N.GONORRHOEAE DNA AMP PROB: CPT | Performed by: INTERNAL MEDICINE

## 2023-04-04 ASSESSMENT — ENCOUNTER SYMPTOMS
SORE THROAT: 0
HEADACHES: 0
JOINT SWELLING: 0
DIZZINESS: 0
CONSTIPATION: 0
ABDOMINAL PAIN: 0
FREQUENCY: 0
COUGH: 1
DIARRHEA: 0
HEMATOCHEZIA: 0
DYSURIA: 0
EYE PAIN: 0
FEVER: 0
HEMATURIA: 0
ARTHRALGIAS: 0
WEAKNESS: 0
MYALGIAS: 0
NERVOUS/ANXIOUS: 1
PARESTHESIAS: 0
CHILLS: 0
NAUSEA: 0
PALPITATIONS: 0
SHORTNESS OF BREATH: 1
HEARTBURN: 0

## 2023-04-04 ASSESSMENT — PAIN SCALES - GENERAL: PAINLEVEL: NO PAIN (0)

## 2023-04-04 NOTE — LETTER
"April 5, 2023      Jarred Coates  [address] [address]        Dear ,    We are writing to inform you of your test results.    Willem Odonnell!     Your results are in.  Everything looks good.  There are some values that may be slightly out of the \"normal\" range, but are not worrisome.     Cholesterol is flagged as a bit high.  Something we can continue to keep an eye on.  Current guidelines do not suggest an intervention until after age 40.     Let me know if you have any questions.  If there's a particular blood test result you're concerned about please schedule a follow up appointment at your convenience.           Russell Meier MD on 4/5/2023 at 1:23 PM     Resulted Orders   Hepatitis C Screen Reflex to HCV RNA Quant and Genotype   Result Value Ref Range    Hepatitis C Antibody Nonreactive Nonreactive    Narrative    Assay performance characteristics have not been established for newborns, infants, and children.   Comprehensive metabolic panel   Result Value Ref Range    Sodium 139 136 - 145 mmol/L    Potassium 4.2 3.4 - 5.3 mmol/L    Chloride 103 98 - 107 mmol/L    Carbon Dioxide (CO2) 25 22 - 29 mmol/L    Anion Gap 11 7 - 15 mmol/L    Urea Nitrogen 11.7 6.0 - 20.0 mg/dL    Creatinine 0.83 0.67 - 1.17 mg/dL    Calcium 9.3 8.6 - 10.0 mg/dL    Glucose 105 (H) 70 - 99 mg/dL    Alkaline Phosphatase 66 40 - 129 U/L    AST 19 10 - 50 U/L    ALT 14 10 - 50 U/L    Protein Total 7.2 6.4 - 8.3 g/dL    Albumin 4.3 3.5 - 5.2 g/dL    Bilirubin Total 0.2 <=1.2 mg/dL    GFR Estimate >90 >60 mL/min/1.73m2      Comment:      eGFR calculated using 2021 CKD-EPI equation.   TSH with free T4 reflex   Result Value Ref Range    TSH 1.39 0.30 - 4.20 uIU/mL   Lipid panel reflex to direct LDL Fasting   Result Value Ref Range    Cholesterol 242 (H) <200 mg/dL    Triglycerides 82 <150 mg/dL    Direct Measure HDL 78 >=40 mg/dL    LDL Cholesterol Calculated 148 (H) <=100 mg/dL    Non HDL Cholesterol 164 (H) " <130 mg/dL    Narrative    Cholesterol  Desirable:  <200 mg/dL    Triglycerides  Normal:  Less than 150 mg/dL  Borderline High:  150-199 mg/dL  High:  200-499 mg/dL  Very High:  Greater than or equal to 500 mg/dL    Direct Measure HDL  Female:  Greater than or equal to 50 mg/dL   Male:  Greater than or equal to 40 mg/dL    LDL Cholesterol  Desirable:  <100mg/dL  Above Desirable:  100-129 mg/dL   Borderline High:  130-159 mg/dL   High:  160-189 mg/dL   Very High:  >= 190 mg/dL    Non HDL Cholesterol  Desirable:  130 mg/dL  Above Desirable:  130-159 mg/dL  Borderline High:  160-189 mg/dL  High:  190-219 mg/dL  Very High:  Greater than or equal to 220 mg/dL   Treponema Abs w Reflex to RPR and Titer   Result Value Ref Range    Treponema Antibody Total Nonreactive Nonreactive   HIV Antigen Antibody Combo   Result Value Ref Range    HIV Antigen Antibody Combo Nonreactive Nonreactive      Comment:      HIV-1 p24 Ag & HIV-1/HIV-2 Ab Not Detected   NEISSERIA GONORRHOEA PCR   Result Value Ref Range    Neisseria gonorrhoeae Negative Negative      Comment:      Negative for N. gonorrhoeae rRNA by transcription mediated amplification. A negative result by transcription mediated amplification does not preclude the presence of C. trachomatis infection because results are dependent on proper and adequate collection, absence of inhibitors and sufficient rRNA to be detected.   CHLAMYDIA TRACHOMATIS PCR   Result Value Ref Range    Chlamydia trachomatis Negative Negative      Comment:      A negative result by transcription mediated amplification does not preclude the presence of C. trachomatis infection because results are dependent on proper and adequate collection, absence of inhibitors and sufficient rRNA to be detected.   CBC with platelets and differential   Result Value Ref Range    WBC Count 8.3 4.0 - 11.0 10e3/uL    RBC Count 4.86 4.40 - 5.90 10e6/uL    Hemoglobin 13.5 13.3 - 17.7 g/dL    Hematocrit 42.3 40.0 - 53.0 %    MCV  87 78 - 100 fL    MCH 27.8 26.5 - 33.0 pg    MCHC 31.9 31.5 - 36.5 g/dL    RDW 12.9 10.0 - 15.0 %    Platelet Count 326 150 - 450 10e3/uL    % Neutrophils 61 %    % Lymphocytes 27 %    % Monocytes 8 %    % Eosinophils 3 %    % Basophils 0 %    % Immature Granulocytes 0 %    Absolute Neutrophils 5.1 1.6 - 8.3 10e3/uL    Absolute Lymphocytes 2.3 0.8 - 5.3 10e3/uL    Absolute Monocytes 0.7 0.0 - 1.3 10e3/uL    Absolute Eosinophils 0.3 0.0 - 0.7 10e3/uL    Absolute Basophils 0.0 0.0 - 0.2 10e3/uL    Absolute Immature Granulocytes 0.0 <=0.4 10e3/uL       If you have any questions or concerns, please call the clinic at the number listed above.       Sincerely,      Russell Meier MD

## 2023-04-04 NOTE — LETTER
"April 5, 2023      Jarred Coates  [address] [address]        Dear ,    We are writing to inform you of your test results.    Willem Odonnell!     Your results are in.  Everything looks good.  There are some values that may be slightly out of the \"normal\" range, but are not worrisome.     Cholesterol is flagged as a bit high.  Something we can continue to keep an eye on.  Current guidelines do not suggest an intervention until after age 40.     Let me know if you have any questions.  If there's a particular blood test result you're concerned about please schedule a follow up appointment at your convenience.           Russell Meier MD on 4/5/2023 at 1:23 PM     Resulted Orders   Hepatitis C Screen Reflex to HCV RNA Quant and Genotype   Result Value Ref Range    Hepatitis C Antibody Nonreactive Nonreactive    Narrative    Assay performance characteristics have not been established for newborns, infants, and children.   Comprehensive metabolic panel   Result Value Ref Range    Sodium 139 136 - 145 mmol/L    Potassium 4.2 3.4 - 5.3 mmol/L    Chloride 103 98 - 107 mmol/L    Carbon Dioxide (CO2) 25 22 - 29 mmol/L    Anion Gap 11 7 - 15 mmol/L    Urea Nitrogen 11.7 6.0 - 20.0 mg/dL    Creatinine 0.83 0.67 - 1.17 mg/dL    Calcium 9.3 8.6 - 10.0 mg/dL    Glucose 105 (H) 70 - 99 mg/dL    Alkaline Phosphatase 66 40 - 129 U/L    AST 19 10 - 50 U/L    ALT 14 10 - 50 U/L    Protein Total 7.2 6.4 - 8.3 g/dL    Albumin 4.3 3.5 - 5.2 g/dL    Bilirubin Total 0.2 <=1.2 mg/dL    GFR Estimate >90 >60 mL/min/1.73m2      Comment:      eGFR calculated using 2021 CKD-EPI equation.   TSH with free T4 reflex   Result Value Ref Range    TSH 1.39 0.30 - 4.20 uIU/mL   Lipid panel reflex to direct LDL Fasting   Result Value Ref Range    Cholesterol 242 (H) <200 mg/dL    Triglycerides 82 <150 mg/dL    Direct Measure HDL 78 >=40 mg/dL    LDL Cholesterol Calculated 148 (H) <=100 mg/dL    Non HDL Cholesterol 164 (H) " <130 mg/dL    Narrative    Cholesterol  Desirable:  <200 mg/dL    Triglycerides  Normal:  Less than 150 mg/dL  Borderline High:  150-199 mg/dL  High:  200-499 mg/dL  Very High:  Greater than or equal to 500 mg/dL    Direct Measure HDL  Female:  Greater than or equal to 50 mg/dL   Male:  Greater than or equal to 40 mg/dL    LDL Cholesterol  Desirable:  <100mg/dL  Above Desirable:  100-129 mg/dL   Borderline High:  130-159 mg/dL   High:  160-189 mg/dL   Very High:  >= 190 mg/dL    Non HDL Cholesterol  Desirable:  130 mg/dL  Above Desirable:  130-159 mg/dL  Borderline High:  160-189 mg/dL  High:  190-219 mg/dL  Very High:  Greater than or equal to 220 mg/dL   Treponema Abs w Reflex to RPR and Titer   Result Value Ref Range    Treponema Antibody Total Nonreactive Nonreactive   HIV Antigen Antibody Combo   Result Value Ref Range    HIV Antigen Antibody Combo Nonreactive Nonreactive      Comment:      HIV-1 p24 Ag & HIV-1/HIV-2 Ab Not Detected   NEISSERIA GONORRHOEA PCR   Result Value Ref Range    Neisseria gonorrhoeae Negative Negative      Comment:      Negative for N. gonorrhoeae rRNA by transcription mediated amplification. A negative result by transcription mediated amplification does not preclude the presence of C. trachomatis infection because results are dependent on proper and adequate collection, absence of inhibitors and sufficient rRNA to be detected.   CHLAMYDIA TRACHOMATIS PCR   Result Value Ref Range    Chlamydia trachomatis Negative Negative      Comment:      A negative result by transcription mediated amplification does not preclude the presence of C. trachomatis infection because results are dependent on proper and adequate collection, absence of inhibitors and sufficient rRNA to be detected.   CBC with platelets and differential   Result Value Ref Range    WBC Count 8.3 4.0 - 11.0 10e3/uL    RBC Count 4.86 4.40 - 5.90 10e6/uL    Hemoglobin 13.5 13.3 - 17.7 g/dL    Hematocrit 42.3 40.0 - 53.0 %    MCV  87 78 - 100 fL    MCH 27.8 26.5 - 33.0 pg    MCHC 31.9 31.5 - 36.5 g/dL    RDW 12.9 10.0 - 15.0 %    Platelet Count 326 150 - 450 10e3/uL    % Neutrophils 61 %    % Lymphocytes 27 %    % Monocytes 8 %    % Eosinophils 3 %    % Basophils 0 %    % Immature Granulocytes 0 %    Absolute Neutrophils 5.1 1.6 - 8.3 10e3/uL    Absolute Lymphocytes 2.3 0.8 - 5.3 10e3/uL    Absolute Monocytes 0.7 0.0 - 1.3 10e3/uL    Absolute Eosinophils 0.3 0.0 - 0.7 10e3/uL    Absolute Basophils 0.0 0.0 - 0.2 10e3/uL    Absolute Immature Granulocytes 0.0 <=0.4 10e3/uL       If you have any questions or concerns, please call the clinic at the number listed above.       Sincerely,      Russell Meier MD

## 2023-04-05 LAB
C TRACH DNA SPEC QL NAA+PROBE: NEGATIVE
HCV AB SERPL QL IA: NONREACTIVE
HIV 1+2 AB+HIV1 P24 AG SERPL QL IA: NONREACTIVE
N GONORRHOEA DNA SPEC QL NAA+PROBE: NEGATIVE
T PALLIDUM AB SER QL: NONREACTIVE

## 2024-04-18 ENCOUNTER — PATIENT OUTREACH (OUTPATIENT)
Dept: CARE COORDINATION | Facility: CLINIC | Age: 34
End: 2024-04-18
Payer: COMMERCIAL

## 2024-04-18 NOTE — PROGRESS NOTES
Clinic Care Coordination Contact  Program:   [facility]: [place]   Renewal: [facility]   Date Applied:      RADHA Outreach:   4/18/24: CTA called to see if patient needed assistance with their [facility] Renewal. Patient declined needing assistance and no follow up needed   CTA WILL E-MAIL APPLICATION TO ROSLYN Portillo  Care   [facility]  Clinic Care Coordination  [phone]      Health Insurance:        Referral/Screening: